# Patient Record
Sex: FEMALE | Race: ASIAN | URBAN - METROPOLITAN AREA
[De-identification: names, ages, dates, MRNs, and addresses within clinical notes are randomized per-mention and may not be internally consistent; named-entity substitution may affect disease eponyms.]

---
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
## 2023-01-01 NOTE — TELEPHONE ENCOUNTER
Paola John 77 (mom) wants to know what vaccines is needed for patient to travel out of the country. Per mom, patient is going to [facility] next month.  Please advise (3) adequate

## 2023-10-18 NOTE — TELEPHONE ENCOUNTER
Are you willing to place a referral for cardiology before patient comes in for [address] [place] [address] in July? No